# Patient Record
Sex: MALE | Race: OTHER
[De-identification: names, ages, dates, MRNs, and addresses within clinical notes are randomized per-mention and may not be internally consistent; named-entity substitution may affect disease eponyms.]

---

## 2018-09-29 ENCOUNTER — HOSPITAL ENCOUNTER (EMERGENCY)
Dept: HOSPITAL 80 - FED | Age: 23
Discharge: HOME | End: 2018-09-29
Payer: COMMERCIAL

## 2018-09-29 VITALS — DIASTOLIC BLOOD PRESSURE: 54 MMHG | SYSTOLIC BLOOD PRESSURE: 120 MMHG

## 2018-09-29 DIAGNOSIS — B33.8: Primary | ICD-10-CM

## 2018-09-29 DIAGNOSIS — E86.0: ICD-10-CM

## 2018-09-29 LAB — PLATELET # BLD: 92 10^3/UL (ref 150–400)

## 2018-09-29 NOTE — EDPHY
H & P


Stated Complaint: chills, body aches, sore throat, cough, fatigue x 6 days


Time Seen by Provider: 09/29/18 15:09


HPI/ROS: 





CHIEF COMPLAINT:  Fever, weakness





HISTORY OF PRESENT ILLNESS:  22-year-old male presents with fever and weakness 

.  Onset of fever 5 days ago.  The fever is associated with sore throat, 

moderate and persistent.  Also has diffuse myalgias, excessive fatigue and 

intermittent headache.  Symptoms temporarily alleviated with ibuprofen, but 

then return.  No cough or shortness of breath.  Tolerating oral fluids well.  

No vomiting or diarrhea.  Multiple ill contacts.  Was in [place] 6 weeks ago in a 

large city only, no other travel.  





REVIEW OF SYSTEMS:  complete 10 point ROS reviewed and is negative except for 

the noted elements in the HPI








- Medical/Surgical History


Hx Asthma: No


Hx Chronic Respiratory Disease: No


Hx Diabetes: No


Hx Cardiac Disease: No


Hx Renal Disease: No


Hx Cirrhosis: No


Hx Alcoholism: No


Hx HIV/AIDS: No


Hx Splenectomy or Spleen Trauma: No


Other PMH: denies





- Social History


Smoking Status: Never smoked


Additional Social History: 





Student at [employer]





- Physical Exam


Exam: 





General Appearance:  Alert, pleasant, nontoxic-appearing


Eyes:  Pupils equal and round, no conjunctival pallor or injection


ENT, Mouth:  Mucous membranes dry, pharyngeal erythema


Neck:  Normal inspection


Respiratory:  Lungs are clear to auscultation


Cardiovascular:  Regular tachycardia


Gastrointestinal:  Abdomen is soft and nontender


Neurological:  A&O, nonfocal exam


Skin:  Warm and dry, no rash


Extremities:  Normal inspection


Psychiatric:  Mood and affect normal





Constitutional: 


 Initial Vital Signs











Temperature (C)  37.9 C   09/29/18 14:31


 


Heart Rate  122 H  09/29/18 14:31


 


Respiratory Rate  18   09/29/18 14:31


 


Blood Pressure  99/69 L  09/29/18 14:31


 


O2 Sat (%)  95   09/29/18 14:31








 











O2 Delivery Mode               Room Air














Allergies/Adverse Reactions: 


 





No Known Allergies Allergy (Unverified 09/29/18 14:30)


 








Home Medications: 














 Medication  Instructions  Recorded


 


Azithromycin  09/29/18














Medical Decision Making





- Diagnostics


Imaging Results: 


 Imaging Impressions





Chest X-Ray  09/29/18 15:10


Impression:  No acute pulmonary disease.











Imaging: I viewed and interpreted images myself


ED Course/Re-evaluation: 





This patient presents with a flu-like illness and dehydration.  Initial heart 

rate 120s.  IV normal saline 1 L and ibuprofen 600 mg orally given.  Fever 

persisted so Tylenol 1 g orally given.  Ultimately the patient was given 3 L of 

normal saline and felt much better after IV fluids and fever reduction.  Fever 

instructions given.  Will follow up for worsening symptoms or any concerns.


Differential Diagnosis: 





Differential diagnosis includes and no particular order pyelonephritis, 

cholecystitis, influenza, cellulitis, pneumonia, abscess, meningitis.





- Data Points


Laboratory Results: 


 Laboratory Results





 09/29/18 15:13 





 09/29/18 15:13 





 











  09/29/18 09/29/18 09/29/18





  Unknown 15:13 15:13


 


WBC      





    


 


RBC      





    


 


Hgb      





    


 


Hct      





    


 


MCV      





    


 


MCH      





    


 


MCHC      





    


 


RDW      





    


 


Plt Count      





    


 


MPV      





    


 


Neut % (Auto)      





    


 


Lymph % (Auto)      





    


 


Mono % (Auto)      





    


 


Eos % (Auto)      





    


 


Baso % (Auto)      





    


 


Nucleat RBC Rel Count      





    


 


Absolute Neuts (auto)      





    


 


Absolute Lymphs (auto)      





    


 


Absolute Monos (auto)      





    


 


Absolute Eos (auto)      





    


 


Absolute Basos (auto)      





    


 


Absolute Nucleated RBC      





    


 


Immature Gran %      





    


 


Immature Gran #      





    


 


Sodium      





    


 


Potassium      





    


 


Chloride      





    


 


Carbon Dioxide      





    


 


Anion Gap      





    


 


BUN      





    


 


Creatinine      





    


 


Estimated GFR      





    


 


Glucose      





    


 


Calcium      





    


 


Nasal Influenza A PCR      





    


 


Nasal Influenza B PCR      





    


 


Monoscreen      NEGATIVE 





     (NEGATIVE) 


 


Group A Strep Screen    NEGATIVE   





    (NEGATIVE)  


 


Group A Strep DNA  Pending     





    














  09/29/18 09/29/18 09/29/18





  15:13 15:13 14:50


 


WBC    6.13 10^3/uL 10^3/uL  





    (3.80-9.50)  


 


RBC    4.78 10^6/uL 10^6/uL  





    (4.40-6.38)  


 


Hgb    13.7 g/dL g/dL  





    (13.7-17.5)  


 


Hct    39.8 % L %  





    (40.0-51.0)  


 


MCV    83.3 fL fL  





    (81.5-99.8)  


 


MCH    28.7 pg pg  





    (27.9-34.1)  


 


MCHC    34.4 g/dL g/dL  





    (32.4-36.7)  


 


RDW    12.1 % %  





    (11.5-15.2)  


 


Plt Count    92 10^3/uL L 10^3/uL  





    (150-400)  


 


MPV    11.9 fL H fL  





    (8.7-11.7)  


 


Neut % (Auto)    81.6 % H %  





    (39.3-74.2)  


 


Lymph % (Auto)    10.9 % L %  





    (15.0-45.0)  


 


Mono % (Auto)    6.9 % %  





    (4.5-13.0)  


 


Eos % (Auto)    0.0 % L %  





    (0.6-7.6)  


 


Baso % (Auto)    0.3 % %  





    (0.3-1.7)  


 


Nucleat RBC Rel Count    0.0 % %  





    (0.0-0.2)  


 


Absolute Neuts (auto)    5.00 10^3/uL 10^3/uL  





    (1.70-6.50)  


 


Absolute Lymphs (auto)    0.67 10^3/uL L 10^3/uL  





    (1.00-3.00)  


 


Absolute Monos (auto)    0.42 10^3/uL 10^3/uL  





    (0.30-0.80)  


 


Absolute Eos (auto)    0.00 10^3/uL L 10^3/uL  





    (0.03-0.40)  


 


Absolute Basos (auto)    0.02 10^3/uL 10^3/uL  





    (0.02-0.10)  


 


Absolute Nucleated RBC    0.00 10^3/uL 10^3/uL  





    (0-0.01)  


 


Immature Gran %    0.3 % %  





    (0.0-1.1)  


 


Immature Gran #    0.02 10^3/uL 10^3/uL  





    (0.00-0.10)  


 


Sodium  137 mEq/L mEq/L    





   (135-145)   


 


Potassium  3.8 mEq/L mEq/L    





   (3.3-5.0)   


 


Chloride  103 mEq/L mEq/L    





   ()   


 


Carbon Dioxide  23 mEq/l mEq/l    





   (22-31)   


 


Anion Gap  11 mEq/L mEq/L    





   (8-16)   


 


BUN  9 mg/dL mg/dL    





   (7-23)   


 


Creatinine  0.9 mg/dL mg/dL    





   (0.7-1.3)   


 


Estimated GFR  > 60     





    


 


Glucose  141 mg/dL H mg/dL    





   ()   


 


Calcium  8.8 mg/dL mg/dL    





   (8.5-10.4)   


 


Nasal Influenza A PCR      NEGATIVE FOR FLU A 





     (NEGATIVE) 


 


Nasal Influenza B PCR      NEGATIVE FOR FLU B 





     (NEGATIVE) 


 


Monoscreen      





    


 


Group A Strep Screen      





    


 


Group A Strep DNA      





    











Medications Given: 


 








Discontinued Medications





Acetaminophen (Tylenol)  1,000 mg PO EDNOW ONE


   Stop: 09/29/18 16:04


   Last Admin: 09/29/18 16:04 Dose:  1,000 mg


Dexamethasone (Decadron)  6 mg PO EDNOW ONE


   Stop: 09/29/18 17:03


   Last Admin: 09/29/18 17:04 Dose:  6 mg


Sodium Chloride (Ns)  1,000 mls @ 3,000 mls/hr IV ONCE ONE


   Stop: 09/29/18 15:44


   Last Admin: 09/29/18 15:26 Dose:  1,000 mls


Sodium Chloride (Ns)  1,000 mls @ 3,000 mls/hr IV ONCE ONE


   Stop: 09/29/18 16:22


   Last Admin: 09/29/18 16:05 Dose:  1,000 mls


Sodium Chloride (Ns)  1,000 mls @ 3,000 mls/hr IV ONCE ONE


   Stop: 09/29/18 17:21


   Last Admin: 09/29/18 17:04 Dose:  1,000 mls


Ibuprofen (Motrin)  600 mg PO EDNOW ONE


   Stop: 09/29/18 15:12


   Last Admin: 09/29/18 15:24 Dose:  600 mg








Departure





- Departure


Disposition: Home, Routine, Self-Care


Clinical Impression: 


 Viral syndrome





Condition: Good


Instructions:  Viral Syndrome (ED)


Additional Instructions: 


Alternate Tylenol and ibuprofen every 3 hr for fever and pain control.


Drink plenty of fluids.


Return for worsening symptoms or any concerns.





Referrals: 


[facility] [Clinic] - As per Instructions

## 2018-10-02 ENCOUNTER — HOSPITAL ENCOUNTER (INPATIENT)
Dept: HOSPITAL 80 - FED | Age: 23
LOS: 2 days | Discharge: HOME | DRG: 97 | End: 2018-10-04
Attending: INTERNAL MEDICINE | Admitting: INTERNAL MEDICINE
Payer: COMMERCIAL

## 2018-10-02 DIAGNOSIS — E87.2: ICD-10-CM

## 2018-10-02 DIAGNOSIS — G03.0: Primary | ICD-10-CM

## 2018-10-02 DIAGNOSIS — A41.9: ICD-10-CM

## 2018-10-02 LAB — PLATELET # BLD: (no result) 10^3/UL (ref 150–400)

## 2018-10-02 PROCEDURE — G0378 HOSPITAL OBSERVATION PER HR: HCPCS

## 2018-10-02 PROCEDURE — 009U3ZX DRAINAGE OF SPINAL CANAL, PERCUTANEOUS APPROACH, DIAGNOSTIC: ICD-10-PCS | Performed by: EMERGENCY MEDICINE

## 2018-10-02 NOTE — HOSPPROG
Hospitalist Progress Note


Assessment/Plan: 





Patient discussed with Vickie Merritt NP as well as personally seen and examined by 

me.  Agree with H&P as outlined by Vickie.   Patient sitting comfortably in bed.





1.  Meningitis - suspect early viral > bacterial - however lots of neutrophils 

seen in CSF differential


   -ID consulted - continue IV ceftriaxone and IV Vanco until cultures negative


2.  Sepsis - as evidenced by fever, tachycardia, leukocytosis (although mild)


   -still suspect viral > bacterial but await blood cultures.


   -lactate cleared with hydration








Objective: 


 Vital Signs











Temp Pulse Resp BP Pulse Ox


 


 37.1 C   79   16   116/53 L  97 


 


 10/02/18 20:00  10/02/18 20:00  10/02/18 20:00  10/02/18 20:00  10/02/18 20:00








 Microbiology











 10/02/18 19:00 Respiratory Panel (PCR) - Final





 Nasal, Sinus - Swab    No Organism Detected








 











 10/01/18 10/02/18 10/03/18





 05:59 05:59 05:59


 


Intake Total   2750


 


Balance   2750








Head CT - negative


d/w Arthur Ambriz ER physician regarding ER course


 Laboratory Tests











  10/02/18 10/02/18





  17:25 17:32


 


CSF WBC  258 H  278 H


 


CSF Neutrophils %  54 H  65 H


 


CSF Lymphocytes %  44  28














- Physical Exam


Constitutional: no apparent distress, appears nourished, not in pain


Cardiovascular: regular rate and rhythym, no murmur, rub, or gallop


Respiratory: no respiratory distress, no rales or rhonchi, clear to auscultation


Gastrointestinal: normoactive bowel sounds, soft, non-tender abdomen, no 

palpable masses


Skin: no rashes or abrasions, no fluctuance, no induration


Neurologic: AAOx3, sensation intact bilaterally


Psychiatric: interacting appropriately, not anxious, not encephalopathic, 

thought process linear





ICD10 Worksheet


Patient Problems: 


 Problems











Problem Status Onset


 


Meningitis Acute

## 2018-10-02 NOTE — EDPHY
H & P


Source: Patient


Exam Limitations: No limitations





- Medical/Surgical History


Hx Asthma: No


Hx Chronic Respiratory Disease: No


Hx Diabetes: No


Hx Cardiac Disease: No


Hx Renal Disease: No


Hx Cirrhosis: No


Hx Alcoholism: No


Hx HIV/AIDS: No


Hx Splenectomy or Spleen Trauma: No


Other PMH: denies





- Social History


Smoking Status: Never smoked


Time Seen by Provider: 10/02/18 16:26


HPI/ROS: 


HPI:  This is a 22-year-old male who presents with





Chief Complaint:  Headache and fever





Location:  Back of neck


Quality:  Pain


Duration:  48 hr


Signs and Symptoms: + fever, no nausea, no vomiting, no diarrhea, no urinary 

symptoms, no chest pain, no shortness of breath, no wheezing, no cough, no sore 

throat, + neck stiffness, no joint pain, no swollen glands, no ear pain, no rash

, no ear pain, no nasal congestion


Timing:  Worsening


Severity:  Moderate


Context:  Patient is a student at [employer], presents from 

[facility] with concerns of meningitis. Started to have a 

fever Thursday TMax 102F.  Patient was seen in the clinic on Friday for fever 

and headache.  Strep test and influenza test along with laboratory studies 

including blood cultures were ordered.  Patient was started on azithromycin.  

Patient was called on Monday that his strep and influenza were negative and 

advised to stop taking the azithromycin which he had taken 3 doses total.  

Patient reports that he is having fevers at home as high as 103.5 taken orally 

daily.  Patient initially started to have a headache behind both eyes but in 

the last 48 hr that has moved to the base of his neck and accompanied by neck 

stiffness.  He was again seen at [facility] today and given 1 L of fluid. He 

reports that ibuprofen makes his headache worse.  Tylenol will transiently 

improve his fever.  Patient reports that he had influenza and strep test taken 

twice and they were negative.  Per patient, laboratory studies were "normal" 

and blood cultures were "inconclusive."  Patient traveled from [facility] 1 

month ago.  Patient denies any altered mental status, photophobia, vomiting, 

seizures. 


Modifying Factors:  Tylenol





Comment: 








ROS:   A comprehensive 10 system review of systems is otherwise negative aside 

from elements mentioned in the history of present illness. 





MEDICAL/SURGICAL/SOCIAL HISTORY: 


Medical history:  Generally healthy.  Does not take any regular medications.


Surgical history:  Denies


Social history:  Never smoked.  Family history noncontributory.








CONSTITUTIONAL:  Ill appearing Citizen of [place] young adult male, awake and alert, no 

obvious distress


HEENT: Atraumatic and normocephalic, PERRL, EOMI.  Nares patent; no rhinorrhea;

  no nasal mucosal edema. Tympanic membranes clear. Oropharynx clear, no 

exudate and dry mucous membranes and lips.  Airway patent.  No lymphadenopathy.

  + mild nuchal rigidity, negative Kernig sign, positive jolt test, negative 

Brudzinski's sign.


Cardiovascular: Normal S1/S2, regular rate, regular rhythm, without murmur rub 

or gallop.


PULMONARY/CHEST:  Symmetrical and nontender. Clear to auscultation bilaterally. 

Good air movement. No accessory muscle usage.


ABDOMEN:  Soft, nondistended, nontender, no rebound, no guarding, no peritoneal 

signs, no masses or organomegaly. No CVAT.


EXTREMITIES:  2/2 pulses, strength 5/5, no deformities, no clubbing, no 

cyanosis or edema.


NEUROLOGICAL: no focal neuro deficits.  GCS 15.


SKIN: Warm and dry, no erythema. no rash.  Good capillary refill. 


 


 ([place])


Constitutional: 


 Initial Vital Signs











Temperature (C)  37.2 C   10/02/18 16:26


 


Heart Rate  98   10/02/18 16:26


 


Respiratory Rate  16   10/02/18 16:26


 


Blood Pressure  134/69 H  10/02/18 16:26


 


O2 Sat (%)  99   10/02/18 16:26








 











O2 Delivery Mode               Room Air














Allergies/Adverse Reactions: 


 





No Known Allergies Allergy (Verified 10/02/18 16:24)


 








Home Medications: 














 Medication  Instructions  Recorded


 


NK [No Known Home Meds]  10/02/18














Medical Decision Making





- Diagnostics


Imaging Results: 


 Imaging Impressions





Head CT  10/02/18 16:32


Impression: There is no acute intracranial abnormality identified on this 

unenhanced CT evaluation.


 


If there is further clinical concern regarding the patient's symptoms, MR 

imaging is suggested, if not otherwise contraindicated.











ED Course/Re-evaluation: 


Vital signs reviewed and stable upon arrival.  No systemic signs.


Patient has a headache accompanied by fever neck stiffness. Concern for 

meningitis.  


Head CT scan, laboratory studies including blood culture and lactic acid, 1 L 

IV fluids ordered as received 1 liter at [facility]


1652:  Lactic acid 2.5.  


1716:  Called by Dr. Sabas, Head CT scan shows no acute intracranial 

process. 


1715:  Lumbar puncture performed by Dr. Ambriz.  IV Toradol ordered


1820:  Notified by nurse that temp 39.2C.  1000 mg of Tylenol given. 


Labs reviewed.  WBC 10 K with left shift


1840:  CSF fluid shows WBCs 278, neutrophils pending


Respiratory pathogen swab ordered


1843: ED decision to consult for admission.  Spoke with hospitalist, Dr. Wilkinson, 

kindly agrees to admit patient to provide further care.  Spoke with Infectious 

Disease, Dr. Roland Fontaine, who requests ordering West Nile serum and CSF as well 

as enterovirus.  Neutrophil count is pending the time of consultation.  If 

neutrophils are elevated, Dr. Fontaine recommends Vancomycin and ceftriaxone but at 

this time it appears to be predominantly viral nature. 





1909:  Repeat lactic acid 1.9. CSF shows 65 neutrophils.  Updated Dr. Fontaine on 

neutrophils and he reports start IV vancomycin and IV ceftriaxone. 








This patient was seen under the supervision of my secondary supervising 

physician.  I evaluated care for this patient independently.  Discussed this 

patient with Dr. Ambriz who did see the patient.  


 (Jacqueline Luu)





Independent physician evaluation:





I evaluated and participated in the management of the patient.  I also 

evaluated the patient independently.  My co-signature indicates that I have 

reviewed this chart and I agree with the findings and plan of care as 

documented.  My personal H&P findings include:  The patient presents the 

emergency department with fever, headache and neck stiffness for the past 

several days.  He was evaluated ordered for without a clear source of his 

symptoms.  Given concerns for possible meningitis he was referred to the ED.  

The patient does complain of a bifrontal headache and nuchal rigidity.  He 

denies any acute neurologic symptoms.





Physical exam:


General Appearance:  Alert, appears uncomfortable


Eyes:  Pupils equal and round no pallor or injection


ENT, Mouth:  Mucous membranes moist


Respiratory:  There are no retractions, lungs are clear to auscultation


Cardiovascular:  Regular rate and rhythm


Gastrointestinal:  Abdomen is soft and nontender, no masses, bowel sounds normal


Neurological:  A&O, normal motor function, normal sensory exam, normal cranial 

nerves


Skin:  Warm and dry, no rashes


Musculoskeletal:  Mild meningeal symptoms noted with forward flexion


Extremities:  symmetrical, full range of motion


Psychiatric:  Patient is oriented X 3, there is no agitation





ED course:


Noncontrast head CT scan demonstrates no evidence of a space-occupying lesion 

or abscess.





The patient was verbally consented to undergo lumbar puncture which was 

performed by myself without complication.





CSF does demonstrate evidence of a elevated white blood cell count without 

significant red blood cells.  The patient does have a slight left shift.  

Normal protein and glucose are noted.





Consultation was made with Dr. Fontaine from Infectious Disease who recommends 

empiric treatment with vancomycin and ceftriaxone.





Additional viral serology testing has been ordered.





The patient will be admitted to the hospital service for observation this 

evening.  The case was discussed with Dr. Nishi Wilkinson at 7:00 p.m.





Procedure:


Procedure:  Lumbar puncture.





Indication:  headache





After verbal informed consent from patient explaining the risks including 

infection, bleeding, and neurologic damage, a lumbar puncture was performed 

after the patient was prepped and draped in the usual fashion.


The back was anesthetized with 1% lidocaine.  Approximately 4 cc of clear fluid 

was obtained.  Opening pressure was not obtained.  There were no complications.


The procedure was performed by myself. (Pillo Ambriz)


Differential Diagnosis: 


Differential diagnosis includes tension headache, meningitis, encephalitis, 

viral syndrome, sinusitis, dehydration. 


 (Jacqueline Luu)





- Data Points


Laboratory Results: 


 Laboratory Results





 10/02/18 16:40 





 10/02/18 16:40 





 











  10/02/18 10/02/18 10/02/18





  18:30 17:32 17:25


 


WBC      





    


 


RBC      





    


 


Hgb      





    


 


Hct      





    


 


MCV      





    


 


MCH      





    


 


MCHC      





    


 


RDW      





    


 


Plt Count      





    


 


MPV      





    


 


Neut % (Auto)      





    


 


Lymph % (Auto)      





    


 


Mono % (Auto)      





    


 


Eos % (Auto)      





    


 


Baso % (Auto)      





    


 


Nucleat RBC Rel Count      





    


 


Absolute Neuts (auto)      





    


 


Absolute Lymphs (auto)      





    


 


Absolute Monos (auto)      





    


 


Absolute Eos (auto)      





    


 


Absolute Basos (auto)      





    


 


Absolute Nucleated RBC      





    


 


Immature Gran %      





    


 


Immature Gran #      





    


 


Platelet Estimate      





    


 


VBG Lactic Acid  1.2 mmol/L mmol/L    





   (0.7-2.1)   


 


Sodium      





    


 


Potassium      





    


 


Chloride      





    


 


Carbon Dioxide      





    


 


Anion Gap      





    


 


BUN      





    


 


Creatinine      





    


 


Estimated GFR      





    


 


Glucose      





    


 


Calcium      





    


 


Total Bilirubin      





    


 


Conjugated Bilirubin      





    


 


Unconjugated Bilirubin      





    


 


AST      





    


 


ALT      





    


 


Alkaline Phosphatase      





    


 


Total Protein      





    


 


Albumin      





    


 


Fl Pathologist Review    Pending   





    


 


CSF Tube Number    1   





    


 


CSF Appearance    CLEAR   





    (CLEAR)  


 


CSF Color    COLORLESS   





    (COLORLESS)  


 


CSF Supernatant    TNP   





    


 


CSF WBC    278 /mm3 H /mm3  





    (0-5)  


 


CSF RBC    0 /mm3 /mm3  





    (0-0)  


 


CSF Neutrophils %    65 % H %  





    (0-6)  


 


CSF Lymphocytes %    28 % %  





    (0-100)  


 


CSF Monos/Macrophage %    7 % %  





    (0-45)  


 


CSF Glucose    56 mg/dL mg/dL  





    (50-75)  


 


CSF Total Protein    53 mg/dL mg/dL  





    (12-60)  


 


CSF West Nile IgG Ab      Pending 





    


 


CSF West Nile IgM Ab      Pending 





    


 


CSF West Nile Interp      Pending 





    


 


West Nile Virus IgG Ab      Pending 





    


 


West Nile Virus IgM Ab      Pending 





    


 


West Nile Interp      Pending 





    


 


Enterovirus Source      Pending 





    


 


Enterovirus RNA (PCR)      Pending 





    














  10/02/18 10/02/18 10/02/18





  17:25 16:40 16:40


 


WBC      10.10 10^3/uL H 10^3/uL





     (3.80-9.50) 


 


RBC      5.13 10^6/uL 10^6/uL





     (4.40-6.38) 


 


Hgb      14.7 g/dL g/dL





     (13.7-17.5) 


 


Hct      43.1 % %





     (40.0-51.0) 


 


MCV      84.0 fL fL





     (81.5-99.8) 


 


MCH      28.7 pg pg





     (27.9-34.1) 


 


MCHC      34.1 g/dL g/dL





     (32.4-36.7) 


 


RDW      12.7 % %





     (11.5-15.2) 


 


Plt Count      TNP 





    


 


MPV      TNP 





    


 


Neut % (Auto)      84.9 % H %





     (39.3-74.2) 


 


Lymph % (Auto)      9.4 % L %





     (15.0-45.0) 


 


Mono % (Auto)      4.9 % %





     (4.5-13.0) 


 


Eos % (Auto)      0.1 % L %





     (0.6-7.6) 


 


Baso % (Auto)      0.2 % L %





     (0.3-1.7) 


 


Nucleat RBC Rel Count      0.0 % %





     (0.0-0.2) 


 


Absolute Neuts (auto)      8.58 10^3/uL H 10^3/uL





     (1.70-6.50) 


 


Absolute Lymphs (auto)      0.95 10^3/uL L 10^3/uL





     (1.00-3.00) 


 


Absolute Monos (auto)      0.49 10^3/uL 10^3/uL





     (0.30-0.80) 


 


Absolute Eos (auto)      0.01 10^3/uL L 10^3/uL





     (0.03-0.40) 


 


Absolute Basos (auto)      0.02 10^3/uL 10^3/uL





     (0.02-0.10) 


 


Absolute Nucleated RBC      0.00 10^3/uL 10^3/uL





     (0-0.01) 


 


Immature Gran %      0.5 % %





     (0.0-1.1) 


 


Immature Gran #      0.05 10^3/uL 10^3/uL





     (0.00-0.10) 


 


Platelet Estimate      Pending 





    


 


VBG Lactic Acid    2.5 mmol/L H mmol/L  





    (0.7-2.1)  


 


Sodium      





    


 


Potassium      





    


 


Chloride      





    


 


Carbon Dioxide      





    


 


Anion Gap      





    


 


BUN      





    


 


Creatinine      





    


 


Estimated GFR      





    


 


Glucose      





    


 


Calcium      





    


 


Total Bilirubin      





    


 


Conjugated Bilirubin      





    


 


Unconjugated Bilirubin      





    


 


AST      





    


 


ALT      





    


 


Alkaline Phosphatase      





    


 


Total Protein      





    


 


Albumin      





    


 


Fl Pathologist Review  Pending     





    


 


CSF Tube Number  4     





    


 


CSF Appearance  CLEAR     





   (CLEAR)   


 


CSF Color  COLORLESS     





   (COLORLESS)   


 


CSF Supernatant  TNP     





    


 


CSF WBC  258 /mm3 H /mm3    





   (0-5)   


 


CSF RBC  0 /mm3 /mm3    





   (0-0)   


 


CSF Neutrophils %  54 % H %    





   (0-6)   


 


CSF Lymphocytes %  44 % %    





   (0-100)   


 


CSF Monos/Macrophage %  2 % %    





   (0-45)   


 


CSF Glucose      





    


 


CSF Total Protein      





    


 


CSF West Nile IgG Ab      





    


 


CSF West Nile IgM Ab      





    


 


CSF West Nile Interp      





    


 


West Nile Virus IgG Ab      





    


 


West Nile Virus IgM Ab      





    


 


West Nile Interp      





    


 


Enterovirus Source      





    


 


Enterovirus RNA (PCR)      





    














  10/02/18





  16:40


 


WBC  





  


 


RBC  





  


 


Hgb  





  


 


Hct  





  


 


MCV  





  


 


MCH  





  


 


MCHC  





  


 


RDW  





  


 


Plt Count  





  


 


MPV  





  


 


Neut % (Auto)  





  


 


Lymph % (Auto)  





  


 


Mono % (Auto)  





  


 


Eos % (Auto)  





  


 


Baso % (Auto)  





  


 


Nucleat RBC Rel Count  





  


 


Absolute Neuts (auto)  





  


 


Absolute Lymphs (auto)  





  


 


Absolute Monos (auto)  





  


 


Absolute Eos (auto)  





  


 


Absolute Basos (auto)  





  


 


Absolute Nucleated RBC  





  


 


Immature Gran %  





  


 


Immature Gran #  





  


 


Platelet Estimate  





  


 


VBG Lactic Acid  





  


 


Sodium  137 mEq/L mEq/L





   (135-145) 


 


Potassium  3.7 mEq/L mEq/L





   (3.3-5.0) 


 


Chloride  98 mEq/L mEq/L





   () 


 


Carbon Dioxide  25 mEq/l mEq/l





   (22-31) 


 


Anion Gap  14 mEq/L mEq/L





   (8-16) 


 


BUN  11 mg/dL mg/dL





   (7-23) 


 


Creatinine  1.0 mg/dL mg/dL





   (0.7-1.3) 


 


Estimated GFR  > 60 





  


 


Glucose  118 mg/dL H mg/dL





   () 


 


Calcium  8.8 mg/dL mg/dL





   (8.5-10.4) 


 


Total Bilirubin  0.9 mg/dL mg/dL





   (0.1-1.4) 


 


Conjugated Bilirubin  0.2 mg/dL mg/dL





   (0.0-0.5) 


 


Unconjugated Bilirubin  0.7 mg/dL mg/dL





   (0.0-1.1) 


 


AST  29 IU/L IU/L





   (17-59) 


 


ALT  58 IU/L IU/L





   (21-72) 


 


Alkaline Phosphatase  79 IU/L IU/L





   () 


 


Total Protein  7.5 g/dL g/dL





   (6.3-8.2) 


 


Albumin  3.9 g/dL g/dL





   (3.5-5.0) 


 


Fl Pathologist Review  





  


 


CSF Tube Number  





  


 


CSF Appearance  





  


 


CSF Color  





  


 


CSF Supernatant  





  


 


CSF WBC  





  


 


CSF RBC  





  


 


CSF Neutrophils %  





  


 


CSF Lymphocytes %  





  


 


CSF Monos/Macrophage %  





  


 


CSF Glucose  





  


 


CSF Total Protein  





  


 


CSF West Nile IgG Ab  





  


 


CSF West Nile IgM Ab  





  


 


CSF West Nile Interp  





  


 


West Nile Virus IgG Ab  





  


 


West Nile Virus IgM Ab  





  


 


West Nile Interp  





  


 


Enterovirus Source  





  


 


Enterovirus RNA (PCR)  





  











Microbiology Results: 


 MICROBIOLOGY





10/02/18 17:32   Cerebral Spinal Fluid   Gram Stain - Final





Medications Given: 


 








Discontinued Medications





Acetaminophen (Tylenol)  1,000 mg PO EDNOW ONE


   Stop: 10/02/18 18:18


   Last Admin: 10/02/18 18:24 Dose:  1,000 mg


Sodium Chloride (Ns)  1,000 mls @ 0 mls/hr IV ONCE ONE; Wide Open


   PRN Reason: Protocol


   Stop: 10/02/18 16:32


   Last Admin: 10/02/18 17:26 Dose:  Not Given


Sodium Chloride (Ns)  1,000 mls @ 0 mls/hr IV ONCE ONE; Wide Open


   PRN Reason: Protocol


   Stop: 10/02/18 16:32


   Last Admin: 10/02/18 17:26 Dose:  Not Given


Sodium Chloride (Ns)  2,700 mls @ 5,400 mls/hr 30 ml/kg infuse over 30 min (

2700 ml) IV EDNOW ONE


   PRN Reason: Protocol


   Stop: 10/02/18 17:20


   Last Admin: 10/02/18 16:56 Dose:  2,700 mls


Ketorolac Tromethamine (Toradol)  15 mg IVP EDNOW ONE


   Stop: 10/02/18 17:30


   Last Admin: 10/02/18 17:33 Dose:  15 mg








Departure





- Departure


Disposition: [facility]


Clinical Impression: 


 Meningitis





Condition: Fair

## 2018-10-02 NOTE — PDGENHP
History and Physical





- Chief Complaint


Headache and fever





- History of Present Illness


23 y/o male presents to ED due to ongoing fever, headaches, and neck stiffness.

  It began last Saturday.  Headache is predominantly located in the back of his 

head and there are no alleviating or aggravating factors.  Tylenol seems to 

help his fevers but not his headaches.  He lost his appetite and has had 

difficulty sleeping.  He denies SOB, chest pains, vision changes, photophobia, 

seizures. 





He is being admitted for further diagnostic work-up and pain management. 





History Information





- Allergies/Home Medication List


Allergies/Adverse Reactions: 








No Known Allergies Allergy (Verified 10/02/18 16:24)


 





Home Medications: 








Acetaminophen [Tylenol  mg (*)] 1,000 mg PO Q8H PRN 10/02/18 [Last Taken 

10/01/18]


Ibuprofen [Motrin (*)] 200 mg PO Q6H PRN 10/02/18 [Last Taken 10/01/18]





I have personally reviewed and updated: family history, medical history, social 

history, surgical history





- Past Medical History


no pertinent PMH





- Surgical History


Reports: no pertinent surgical hx





- Family History


Additional family history: Grandfather - Diabetes





- Social History


Smoking Status: Never smoked


Alcohol Use: None


Drug Use: None


Additional social history: Lives with two roommates off campus





Review of Systems


Review of Systems: 


Lab Data and Imaging Reviewed.


ROS: 10pt was reviewed & negative except for what was stated in HPI & below


Constitutional: Reports: diaphoresis, fever, malaise, weakness


EENMT: Reports: no symptoms


Cardiac: Reports: no symptoms


Respiratory: Reports: no symptoms


Gastrointestinal: Reports: no symptoms


Genitourinary: Reports: no symptoms


Muscolosketal: Reports: neck pain


Skin: Reports: no symptoms


Neurological: Reports: headache, weakness


Hematologic/Lymphatic: Reports: no symptoms


Immunologic/Allergy: Reports: no symptoms





Physical Exam


Physical Exam: 

















Temp Pulse Resp BP Pulse Ox


 


 37.1 C   85   16   125/85 H  96 


 


 10/02/18 19:28  10/02/18 19:28  10/02/18 19:28  10/02/18 19:28  10/02/18 19:28











Constitutional: chronically ill appearing


Eyes: PERRL, anicteric sclera, EOMI


Ears, Nose, Mouth, Throat: moist mucous membranes, hearing normal, ears appear 

normal, no oral mucosal ulcers


Cardiovascular: regular rate and rhythym, no murmur, rub, or gallop, No edema


Respiratory: no respiratory distress, no rales or rhonchi, clear to auscultation


Gastrointestinal: normoactive bowel sounds, soft, non-tender abdomen, no 

palpable masses


Genitourinary: no bladder fullness, no bladder tenderness


Skin: warm, normal color, no rashes or abrasions, no fluctuance, no induration, 

No mottled


Musculoskeletal: full muscle strength, normal joint ROM, no joint effusions


Neurologic: AAOx3, sensation intact bilaterally, other (Negative Kernig's and 

Brudzinski's sign)


Psychiatric: interacting appropriately, not anxious, not encephalopathic, 

thought process linear


Lymph, Heme, Immunologic: no cervical LAD, no supraclavicular LAD





Lab Data & Imaging Review





 10/02/18 16:40





 10/02/18 16:40














WBC  10.10 10^3/uL (3.80-9.50)  H  10/02/18  16:40    


 


RBC  5.13 10^6/uL (4.40-6.38)   10/02/18  16:40    


 


Hgb  14.7 g/dL (13.7-17.5)   10/02/18  16:40    


 


Hct  43.1 % (40.0-51.0)   10/02/18  16:40    


 


MCV  84.0 fL (81.5-99.8)   10/02/18  16:40    


 


MCH  28.7 pg (27.9-34.1)   10/02/18  16:40    


 


MCHC  34.1 g/dL (32.4-36.7)   10/02/18  16:40    


 


RDW  12.7 % (11.5-15.2)   10/02/18  16:40    


 


Plt Count  TNP   10/02/18  16:40    


 


MPV  TNP   10/02/18  16:40    


 


Neut % (Auto)  84.9 % (39.3-74.2)  H  10/02/18  16:40    


 


Lymph % (Auto)  9.4 % (15.0-45.0)  L  10/02/18  16:40    


 


Mono % (Auto)  4.9 % (4.5-13.0)   10/02/18  16:40    


 


Eos % (Auto)  0.1 % (0.6-7.6)  L  10/02/18  16:40    


 


Baso % (Auto)  0.2 % (0.3-1.7)  L  10/02/18  16:40    


 


Nucleat RBC Rel Count  0.0 % (0.0-0.2)   10/02/18  16:40    


 


Absolute Neuts (auto)  8.58 10^3/uL (1.70-6.50)  H  10/02/18  16:40    


 


Absolute Lymphs (auto)  0.95 10^3/uL (1.00-3.00)  L  10/02/18  16:40    


 


Absolute Monos (auto)  0.49 10^3/uL (0.30-0.80)   10/02/18  16:40    


 


Absolute Eos (auto)  0.01 10^3/uL (0.03-0.40)  L  10/02/18  16:40    


 


Absolute Basos (auto)  0.02 10^3/uL (0.02-0.10)   10/02/18  16:40    


 


Absolute Nucleated RBC  0.00 10^3/uL (0-0.01)   10/02/18  16:40    


 


Immature Gran %  0.5 % (0.0-1.1)   10/02/18  16:40    


 


Immature Gran #  0.05 10^3/uL (0.00-0.10)   10/02/18  16:40    


 


VBG Lactic Acid  1.2 mmol/L (0.7-2.1)   10/02/18  18:30    


 


Sodium  137 mEq/L (135-145)   10/02/18  16:40    


 


Potassium  3.7 mEq/L (3.3-5.0)   10/02/18  16:40    


 


Chloride  98 mEq/L ()   10/02/18  16:40    


 


Carbon Dioxide  25 mEq/l (22-31)   10/02/18  16:40    


 


Anion Gap  14 mEq/L (8-16)   10/02/18  16:40    


 


BUN  11 mg/dL (7-23)   10/02/18  16:40    


 


Creatinine  1.0 mg/dL (0.7-1.3)   10/02/18  16:40    


 


Estimated GFR  > 60   10/02/18  16:40    


 


Glucose  118 mg/dL ()  H  10/02/18  16:40    


 


Calcium  8.8 mg/dL (8.5-10.4)   10/02/18  16:40    


 


Total Bilirubin  0.9 mg/dL (0.1-1.4)   10/02/18  16:40    


 


Conjugated Bilirubin  0.2 mg/dL (0.0-0.5)   10/02/18  16:40    


 


Unconjugated Bilirubin  0.7 mg/dL (0.0-1.1)   10/02/18  16:40    


 


AST  29 IU/L (17-59)   10/02/18  16:40    


 


ALT  58 IU/L (21-72)   10/02/18  16:40    


 


Alkaline Phosphatase  79 IU/L ()   10/02/18  16:40    


 


Total Protein  7.5 g/dL (6.3-8.2)   10/02/18  16:40    


 


Albumin  3.9 g/dL (3.5-5.0)   10/02/18  16:40    


 


CSF Tube Number  1   10/02/18  17:32    


 


CSF Appearance  CLEAR  (CLEAR)   10/02/18  17:32    


 


CSF Color  COLORLESS  (COLORLESS)   10/02/18  17:32    


 


CSF Supernatant  TNP   10/02/18  17:32    


 


CSF WBC  278 /mm3 (0-5)  H  10/02/18  17:32    


 


CSF RBC  0 /mm3 (0-0)   10/02/18  17:32    


 


CSF Neutrophils %  65 % (0-6)  H  10/02/18  17:32    


 


CSF Lymphocytes %  28 % (0-100)   10/02/18  17:32    


 


CSF Monos/Macrophage %  7 % (0-45)   10/02/18  17:32    


 


CSF Glucose  56 mg/dL (50-75)   10/02/18  17:32    


 


CSF Total Protein  53 mg/dL (12-60)   10/02/18  17:32    











Assessment & Plan


Assessment: 








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 headaches, and neck 

stiffness suggesting acute meningitis, suspecting viral meningitis.








Plan: 





#Viral Meningitis


-ID consulted and aware


-Broad-spectrum antibiotics initiated and will continue until blood cultures 

result


-Will re-assess choice of antibiotics once blood cultures result





#Fever


-Continue use of Tylenol PRN


-Ice packs OK


-IVF


-Encourage use of IS


-Encourage deep breathing


-Encourage oral fluids





#Pain


-IV/PO medications PRN





Diet: Regular


Code: Full


VTE ppx: Moderate - if tolerated, ambulate.  SCDs while in bed.


Dispo: Admit to inpatient

## 2018-10-03 LAB
HIV TYPE 1 AND 2: NEGATIVE
PLATELET # BLD: 169 10^3/UL (ref 150–400)

## 2018-10-03 RX ADMIN — SODIUM CHLORIDE SCH MLS: 900 INJECTION, SOLUTION INTRAVENOUS at 13:23

## 2018-10-03 RX ADMIN — ACETAMINOPHEN PRN MG: 325 TABLET ORAL at 02:40

## 2018-10-03 RX ADMIN — ACETAMINOPHEN PRN MG: 325 TABLET ORAL at 19:15

## 2018-10-03 RX ADMIN — IBUPROFEN PRN MG: 600 TABLET ORAL at 11:28

## 2018-10-03 RX ADMIN — SODIUM CHLORIDE SCH MLS: 900 INJECTION, SOLUTION INTRAVENOUS at 19:15

## 2018-10-03 RX ADMIN — ACETAMINOPHEN PRN MG: 325 TABLET ORAL at 09:17

## 2018-10-03 NOTE — GCON
INFECTIOUS DISEASE CONSULTATION



DATE OF CONSULTATION:  10/03/2018



REFERRING PHYSICIAN:  Nishi Wilkinson MD





REASON FOR CONSULTATION:  Meningitis.



HISTORY OF PRESENT ILLNESS:  A 22-year-old healthy male who is attending  
studying [employer], who was in his usual state of health until 
approximately 09/26, when he developed daily fevers responsive to Tylenol.  
Patient in the interim eventually presented to [facility] for his fevers and 
headaches.  He underwent a strep and influenza test, which were negative, and 
he was prescribed azithromycin.  His symptoms did not improve, and he presented 
to the emergency room 09/29 and was discharged with a diagnosis of viral 
syndrome.  He re-presented on 10/02, with a fever to 103 for ongoing 
evaluation.  Lumbar puncture was performed at that time in which 4 cc of clear 
fluid was removed.  Cell count in tube 2 was 258 with 54% neutrophils, 44% 
lymphocytes, glucose was 56, protein was 53.  The patient was started on 
ceftriaxone and vancomycin prior to evaluation by Infectious Disease today.  
The patient travels to [facility] annually.  That is where he grew up in [facility].  He was there 1.5 months ago for 10 days.  No unusual exposures while he 
was there.  He lives with 2 roommates off campus in [place].  No sick contacts.
  He denies any recognition of mosquito bites and mostly spends his time 
watching Netflix or playing pool when he is not studying.



PAST MEDICAL HISTORY:  Negative.



PAST SURGICAL HISTORY:  Negative.



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  No tobacco.  No alcohol.  Travel history as above.



FAMILY HISTORY:  Negative.



ALLERGIES:  NKDA.



MEDICATIONS:  At home none.  Ceftriaxone 2 g IV daily, vancomycin 1.5 g q.12.



REVIEW OF SYSTEMS:  A complete 10-point review of systems was performed and is 
negative except as mentioned in the HPI.  The patient denies any sensation of 
focal neurologic deficits, rash, GI symptoms, joint swelling.



PHYSICAL EXAM:  VITAL SIGNS:  T current 38.6, blood pressure 117/74, 
respiratory rate 18, saturation 96% on room air, heart rate 97.  GENERAL:  This 
is a pleasant male, lying in bed, in mild distress secondary to headache.  HEENT
:  Extraocular muscles are intact.  Pupils are reactive bilaterally.  No 
conjunctival hemorrhages.  Oropharynx good.  Good dentition.  Moist mucous 
membranes.  NECK:  Supple.  No clear meningismus.  CARDIOVASCULAR:  Regular 
rate.  No murmur.  CHEST:  Clear to auscultation bilaterally.  ABDOMEN:  Soft, 
nontender.  No hepatosplenomegaly.  NEUROLOGIC:  Patient's cranial nerves were 
intact without focal deficits and his motor in all 4 extremities was intact.  
He is alert and oriented x4 with fluent speech.  SKIN:  No rashes.



LABORATORY:  Initial white count 10 yesterday.  Today 9.2, hematocrit 36, 
platelets 169.  Initial platelets on 09/29 were 92, 80% neutrophils, 12% 
lymphocytes.  CSF as per HPI.  West Nile antibody and enterovirus PCR pending 
in the CSF.  Mono screen was negative on 09/29.  Blood cultures and CSF 
cultures from 10/02/2018, are negative.  Gram stain of CSF was negative for 
organisms.



IMAGING DATA:  Head CT was performed 10/02/2018.  That was negative.



ASSESSMENT AND PLAN:  This is a 22-year-old male with a 10 day illness, 
subsequently found to have cerebrospinal fluid pleocytosis with normal glucose 
and protein, most consistent with viral meningitis.  Highly unlikely bacterial 
meningitis as illness has been going on for 10 days plus normal glucose and 
total protein.  Febrile component of illness is a bit longer than typical, as 
average is 7 to 10 days but illness can be longer.  Most likely consideration 
include enterovirus, WNV .  Less likely cytomegalovirus .  Monospot was 
negative 2 days ago.

1.  Would discontinue antibiotic therapy.

2.  Can discontinue droplet precautions.

3.  Would continue supportive care and monitoring of laboratory data.  Would 
add cytomegalovirus, human immunodeficiency virus testing.  



Thank you for this consultation.  Will continue to follow on a daily basis.





Job #:  [id number]

MTDD

## 2018-10-03 NOTE — HOSPPROG
Hospitalist Progress Note


Assessment/Plan: 





#Viral meningitis: awaiting serologies. ID consulted, discontinued abx. 

Negative HIV





#Sepsis: due to above. IVFs, Advil/APAP for fever





#Lactic acidosis: resolved with IVFs





#Diet: regular





#DVT ppx: low-risk





Disp: inpatient admission for ongoing IVFs, serology testing pending


Subjective: fatigued, fevers this morning


Objective: 


 Vital Signs











Temp Pulse Resp BP Pulse Ox


 


 36.6 C   60   14   100/55 L  99 


 


 10/03/18 15:45  10/03/18 15:45  10/03/18 15:45  10/03/18 15:45  10/03/18 15:45








 Microbiology











 10/02/18 19:00 Respiratory Panel (PCR) - Final





 Nasal, Sinus - Swab    No Organism Detected








 Laboratory Results





 10/03/18 04:34 





 











 10/02/18 10/03/18 10/04/18





 05:59 05:59 05:59


 


Intake Total  2750 


 


Balance  2750 














- Time Spent With Patient


Time Spent with Patient: greater than 35 minutes


Time Spent with Patient: Greater than 35 minutes spent on this patients care, 

greater than 50% of time spent counseling, educating, and coordinating care 

regarding the above mentioned plan.





- Physical Exam


Constitutional: other (ill-appearing)


Ears, Nose, Mouth, Throat: dry mucous membranes


Cardiovascular: tachycardia


Respiratory: no respiratory distress


Gastrointestinal: normoactive bowel sounds


Genitourinary: no bladder fullness


Skin: warm


Musculoskeletal: full muscle strength


Neurologic: AAOx3, CN II-XII Intact


Psychiatric: interacting appropriately, flat affect





ICD10 Worksheet


Patient Problems: 


 Problems











Problem Status Onset


 


Meningitis Acute

## 2018-10-03 NOTE — PDMN
Medical Necessity


Medical necessity: Pt meets inpt criteria per MD order and MCG M-221, Meningitis

, Suspected or Viral, a-2 days. y/o admitted w/meningitis and sepsis as 

evidenced by fever, tachycardia, leukocytosis. ID consulted, awaiting serologies

, still w/fevers, ongoing IV fluids, anticipate>2MN for ongoing med nec eval/

treatment.

## 2018-10-03 NOTE — ASMTCMCOM
CM Note

 

CM Note                       

Notes:

Spoke w/RN, pt is a student a [employer] and will dc independent when medically stable. CM available if 

anything changes.



DC Plan: Independent

 

Date Signed:  10/03/2018 03:08 PM

Electronically Signed By:Maribell Rogers RN

## 2018-10-04 VITALS — SYSTOLIC BLOOD PRESSURE: 126 MMHG | DIASTOLIC BLOOD PRESSURE: 72 MMHG

## 2018-10-04 RX ADMIN — ACETAMINOPHEN PRN MG: 325 TABLET ORAL at 00:10

## 2018-10-04 RX ADMIN — IBUPROFEN PRN MG: 600 TABLET ORAL at 04:33

## 2018-10-04 RX ADMIN — SODIUM CHLORIDE SCH MLS: 900 INJECTION, SOLUTION INTRAVENOUS at 04:34

## 2018-10-04 RX ADMIN — ACETAMINOPHEN PRN MG: 325 TABLET ORAL at 15:30

## 2018-10-04 NOTE — PCMIDPN
Assessment/Plan: 


1. Aseptic meningitis an otherwise healthy 22-year-old male:


Patient is much better.  He feels ready to go home.  Have asked him to follow 

up with [facility] next week, and they can call our laboratory to obtain 

enterovirus and West Nile results.  He expressed understanding.  The patient 

has my colleague's card, and understands he can call with any questions moving 

forward.  He expressed understanding.  
































4


Subjective: 


Had a low-grade fever last night, but overall feels better and ready to go 

home.  No nausea or vomiting.  Denies blurred vision, ringing in his ears, 

ongoing headache or photophobia.  No neck stiffness.  No new rashes.  No focal 

weakness, tingling or numbness.  Eating breakfast.





Objective: 


No antibiotics


T-max 38.2 degrees Vital Signs











Temp Pulse Resp BP Pulse Ox


 


 36.4 C   57 L  12   96/43 L  98 


 


 10/04/18 07:19  10/04/18 07:19  10/04/18 07:19  10/04/18 07:19  10/04/18 07:19








 











 10/03/18 10/04/18 10/05/18





 05:59 05:59 05:59


 


Intake Total  200 


 


Balance  200 








CSF enterovirus PCR and West Nile antibodies pending


HIV negative


HSV PCR negative in the CSF





- Physical Exam


General Appearance: alert, no apparent distress


EENT: pharynx normal, other (Neck is supple), No photophobia, No thrush


Respiratory: lungs clear


Cardiac/Chest: regular rate, rhythm


Abdomen: normal bowel sounds


Skin: No rash


Neuro/Psych: no motor/sensory deficits, oriented x 3





ICD10 Worksheet


Patient Problems: 


 Problems











Problem Status Onset


 


Meningitis Acute

## 2018-10-04 NOTE — GDS
DISCHARGE DIAGNOSES:  

1.  Aseptic meningitis. 

2.  Fever. 

3.  Tachycardia. 

4.  Sepsis.  

5.  Headache.



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 
fevers, headache, and neck stiffness.  Symptoms began last Saturday.  Headache 
is in the back of his head.  He denies shortness of breath, chest pain, vision 
change, photophobia, or seizures.



HOSPITAL COURSE BY PROBLEM:  

1.  Aseptic meningitis:  HSV, HIV negative.  West Nile and CMV are pending.  He 
is to follow up at [facility], who will call Infectious Disease with 
results.

2.  Sepsis: tachycardia, fever, and meningitis.  This has since resolved.

3.  Lactic acidosis:  Due to decreased p.o. intake.  Resolved.

4.  Tachycardia:  Due to fevers, resolved with fluids.



DISPOSITION:  Patient is stable for discharge home.



NEW MEDICATIONS:  Advil or Tylenol.



FOLLOWUP:  [facility] who will call Infectious Disease for remaining 
culture data.



PHYSICAL EXAMINATION:  VITAL SIGNS:  Today, temperature 36.7, blood pressure is 
116/67, heart rate is in the 60s, respirations 12, 99% on room air.  GENERAL:  
He is much brighter today.  No acute distress.  HEENT:  Moist mucous membranes.
  CV:  Regular rhythm.  LUNGS:  Clear.  ABDOMEN:  Soft, nontender, 
nondistended.  Positive bowel sounds.  :  No [place].  MUSCULOSKELETAL:  5/5 
upper and lower extremity strength.  NEURO:  2 through 12 intact.  PSYCH:  
Alert and oriented x3. 



Time spent on discharge:  Greater than 30 minutes counseling patient on symptom 
management and followup with Infectious Disease and [facility].





Job #:  [id number]

MTDD